# Patient Record
Sex: MALE
[De-identification: names, ages, dates, MRNs, and addresses within clinical notes are randomized per-mention and may not be internally consistent; named-entity substitution may affect disease eponyms.]

---

## 2023-08-28 PROBLEM — Z00.00 ENCOUNTER FOR PREVENTIVE HEALTH EXAMINATION: Status: ACTIVE | Noted: 2023-08-28

## 2023-08-29 ENCOUNTER — APPOINTMENT (OUTPATIENT)
Dept: ORTHOPEDIC SURGERY | Facility: CLINIC | Age: 43
End: 2023-08-29
Payer: COMMERCIAL

## 2023-08-29 DIAGNOSIS — S62.617S: ICD-10-CM

## 2023-08-29 DIAGNOSIS — M65.331 TRIGGER FINGER, RIGHT MIDDLE FINGER: ICD-10-CM

## 2023-08-29 PROCEDURE — 99203 OFFICE O/P NEW LOW 30 MIN: CPT

## 2023-08-29 RX ORDER — FINASTERIDE 0.1% / MINOXIDIL 7% .1; 7 G/100G; G/100G
SOLUTION TOPICAL
Refills: 0 | Status: ACTIVE | COMMUNITY

## 2023-08-29 RX ORDER — DICLOFENAC SODIUM 75 MG/1
TABLET, DELAYED RELEASE ORAL
Refills: 0 | Status: ACTIVE | COMMUNITY

## 2023-08-29 RX ORDER — TENOFOVIR DISOPROXIL FUMARATE 250 MG/1
TABLET, COATED ORAL
Refills: 0 | Status: ACTIVE | COMMUNITY

## 2023-08-29 NOTE — IMAGING
[de-identified] : LEFT HAND skin intact. no swelling. no TTP. good EPL, FPL. good finger extension, SF flex to mid-palm, other fingers flex to full fist. good finger abduction and adduction.  SILT median, ulnar, radial distributions. palpable radial pulse, brisk cap refill all digits. no triggering.   RIGHT HAND skin intact. no swelling. TTP to MF A1 pulley. good EPL, FPL. good finger extension, flex to full fist. good finger abduction and adduction.  SILT median, ulnar, radial distributions. palpable radial pulse, brisk cap refill all digits. + triggering of MF.   OUTSIDE XRAYS OF LEFT HAND 6/29/23: no acute displaced fracture or dislocation. deformity of small finger proximal phalanx from prior fracture. djd of small finger DIPJ. OUTSIDE XRAYS OF RIGHT HAND 6/29/23: non-displaced fx of volar base of middle finger middle phalanx. concentric PIPJ.

## 2023-08-29 NOTE — HISTORY OF PRESENT ILLNESS
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 Reports that finger was struck by a basketball in mid-June 2023. Symptoms worse on waking. - LEFT small finger pain at end flexion after sustaining a fracture ~2 years ago, which was treated in a splint.  PCP ordered XR @[facility] 6/29/23. Using PO Diclofenac PRN pain. Reports h/o LEFT small finger fx ~2 years ago => treated in splint. has residual loss of flexion.  Hx: HBV. [] : no [FreeTextEntry1] : RIGHT middle finger and LEFT small finger  [FreeTextEntry5] : JONNIE preciado [RHD] 43 year old male is here today c/o RIGHT middle finger and LEFT small finger pain. onset of pain and locking ~3 months ago, denies injury. c/o increased pain and locking in the AM. +xrays ([facility]) +diclofenac PRN  hx of L small finger fx ~2 years ago +splint at that time. was told it wasn't healed properly. c/o inability to completely bend finger.  [de-identified] : xrays ([facility])

## 2023-08-29 NOTE — ASSESSMENT
[FreeTextEntry1] : The condition was explained to the patient. X-rays of RIGHT hand also show non-displaced fracture of RIGHT middle finger middle phalanx volar base, but this is asymptomatic on exam today.  Discussed risks and benefits of treatment options for tenosynovitis - activity modification, NSAID, splint, steroid injection, or surgery. Patient declined CSI at this time.  F/u PRN.